# Patient Record
Sex: MALE | Race: WHITE | NOT HISPANIC OR LATINO | URBAN - METROPOLITAN AREA
[De-identification: names, ages, dates, MRNs, and addresses within clinical notes are randomized per-mention and may not be internally consistent; named-entity substitution may affect disease eponyms.]

---

## 2019-06-23 ENCOUNTER — HOSPITAL ENCOUNTER (EMERGENCY)
Facility: HOSPITAL | Age: 59
Discharge: HOME | End: 2019-06-23
Attending: EMERGENCY MEDICINE
Payer: MEDICAID

## 2019-06-23 VITALS
DIASTOLIC BLOOD PRESSURE: 63 MMHG | HEIGHT: 70 IN | WEIGHT: 212 LBS | RESPIRATION RATE: 22 BRPM | SYSTOLIC BLOOD PRESSURE: 101 MMHG | TEMPERATURE: 98 F | OXYGEN SATURATION: 95 % | HEART RATE: 80 BPM | BODY MASS INDEX: 30.35 KG/M2

## 2019-06-23 DIAGNOSIS — R93.89 NODULAR RADIOLOGIC DENSITY: ICD-10-CM

## 2019-06-23 DIAGNOSIS — R09.82 POST-NASAL DRIP: ICD-10-CM

## 2019-06-23 DIAGNOSIS — F17.200 CURRENT EVERY DAY SMOKER: ICD-10-CM

## 2019-06-23 DIAGNOSIS — R05.9 COUGH: Primary | ICD-10-CM

## 2019-06-23 PROCEDURE — 94761 N-INVAS EAR/PLS OXIMETRY MLT: CPT

## 2019-06-23 PROCEDURE — 25000003 PHARM REV CODE 250: Performed by: PHYSICIAN ASSISTANT

## 2019-06-23 PROCEDURE — 99284 EMERGENCY DEPT VISIT MOD MDM: CPT | Mod: 25

## 2019-06-23 PROCEDURE — 25000242 PHARM REV CODE 250 ALT 637 W/ HCPCS: Performed by: PHYSICIAN ASSISTANT

## 2019-06-23 PROCEDURE — 94640 AIRWAY INHALATION TREATMENT: CPT

## 2019-06-23 RX ORDER — BENZONATATE 100 MG/1
100 CAPSULE ORAL 3 TIMES DAILY PRN
Qty: 20 CAPSULE | Refills: 0 | Status: SHIPPED | OUTPATIENT
Start: 2019-06-23 | End: 2019-07-03

## 2019-06-23 RX ORDER — ACETAMINOPHEN 500 MG
1000 TABLET ORAL
Status: COMPLETED | OUTPATIENT
Start: 2019-06-23 | End: 2019-06-23

## 2019-06-23 RX ORDER — IPRATROPIUM BROMIDE AND ALBUTEROL SULFATE 2.5; .5 MG/3ML; MG/3ML
3 SOLUTION RESPIRATORY (INHALATION)
Status: COMPLETED | OUTPATIENT
Start: 2019-06-23 | End: 2019-06-23

## 2019-06-23 RX ORDER — ALBUTEROL SULFATE 90 UG/1
1-2 AEROSOL, METERED RESPIRATORY (INHALATION) EVERY 6 HOURS PRN
Qty: 1 INHALER | Refills: 0 | Status: SHIPPED | OUTPATIENT
Start: 2019-06-23

## 2019-06-23 RX ADMIN — ACETAMINOPHEN 1000 MG: 500 TABLET, FILM COATED ORAL at 11:06

## 2019-06-23 RX ADMIN — IPRATROPIUM BROMIDE AND ALBUTEROL SULFATE 3 ML: .5; 3 SOLUTION RESPIRATORY (INHALATION) at 11:06

## 2019-06-23 NOTE — ED PROVIDER NOTES
"Encounter Date: 6/23/2019    SCRIBE #1 NOTE: I, Yaima Foster, am scribing for, and in the presence of,  JANNET Salinas. I have scribed the following portions of the note - Other sections scribed: HPI,ROS,PE.       History     Chief Complaint   Patient presents with    Cough     c/o productive cough x2 weeks, sweats, was seen at  and given "antibiotics and cough pill" states med are not working. "I know my body. Something is wrong"      CC: Cough    HPI:  This is a 58 y.o. male with a PMHx of emphysema who presents to the ED with a cc of cough x 2 weeks. He reports being seen at [facility] for same symptoms 1 week ago and rx unknown antibiotics with no relief. Patient had follow up the following Thursday for blood work, however did not bring results. Pt reports associated diaphoresis at night. Pt denies vomiting, sore throat, headache, CP, and abdominal pain.Symptoms are worsened at night lying down. Patient reports not using an inhaler or breathing tx.               The history is provided by the patient. No  was used.     Review of patient's allergies indicates:  No Known Allergies  History reviewed. No pertinent past medical history.  Past Surgical History:   Procedure Laterality Date    SPINE SURGERY       History reviewed. No pertinent family history.  Social History     Tobacco Use    Smoking status: Current Every Day Smoker     Packs/day: 0.50     Types: Cigarettes   Substance Use Topics    Alcohol use: Not Currently    Drug use: Not Currently     Review of Systems   Constitutional: Positive for diaphoresis. Negative for chills and fever.   HENT: Negative for congestion, rhinorrhea and sore throat.    Eyes: Negative for visual disturbance.   Respiratory: Positive for cough. Negative for shortness of breath.    Cardiovascular: Negative for chest pain.   Gastrointestinal: Negative for abdominal pain, diarrhea, nausea and vomiting.   Genitourinary: Negative for dysuria, " frequency and hematuria.   Musculoskeletal: Negative for back pain.   Skin: Negative for rash.   Neurological: Negative for dizziness, weakness and headaches.       Physical Exam     Initial Vitals [06/23/19 1040]   BP Pulse Resp Temp SpO2   116/79 86 20 97.7 °F (36.5 °C) 96 %      MAP       --         Physical Exam    Nursing note and vitals reviewed.  Constitutional: He appears well-developed and well-nourished. No distress.   HENT:   Head: Normocephalic and atraumatic.   Nose: Nose normal.   Mouth/Throat: Mucous membranes are dry.   Patient has cobblestoning to posterior pharynx with nasal congestion.   Eyes: EOM are normal. Pupils are equal, round, and reactive to light.   Neck: Normal range of motion. Neck supple.   Cardiovascular: Normal rate, regular rhythm and normal heart sounds. Exam reveals no gallop and no friction rub.    No murmur heard.  Pulmonary/Chest: Breath sounds normal. No respiratory distress. He has no wheezes. He has no rhonchi. He has no rales.   Transmission of upper airway sounds.   Abdominal: Soft. Bowel sounds are normal. He exhibits no mass. There is no tenderness. There is no rebound and no guarding.   Musculoskeletal: Normal range of motion.   Neurological: He is alert and oriented to person, place, and time. He has normal strength. No cranial nerve deficit or sensory deficit.   Skin: Skin is warm and dry. Capillary refill takes less than 2 seconds.   Psychiatric: He has a normal mood and affect.         ED Course   Procedures  Labs Reviewed - No data to display       Imaging Results           X-Ray Chest PA And Lateral (Final result)  Result time 06/23/19 11:24:59    Final result by Colt Soriano MD (06/23/19 11:24:59)                 Impression:      As above.  Correlation with chest CT with contrast is recommended.    This report was flagged in Epic as abnormal.      Electronically signed by: Colt Soriano MD  Date:    06/23/2019  Time:    11:24             Narrative:     EXAMINATION:  XR CHEST PA AND LATERAL    CLINICAL HISTORY:  Cough    TECHNIQUE:  PA and lateral views of the chest were performed.    COMPARISON:  None    FINDINGS:  Cardiac silhouette is normal in size.    There is a somewhat nodular density present in the posterior right perihilar region, best visualized on the lateral projection.  Left lung appears clear.  No definite pleural effusion visualized.  No acute osseous findings demonstrated.                                 Medical Decision Making:   History:   Old Medical Records: I decided to obtain old medical records.  Initial Assessment:   58 y.o. male with cough  Independently Interpreted Test(s):   I have ordered and independently interpreted X-rays - see prior notes.  Clinical Tests:   Radiological Study: Ordered and Reviewed  ED Management:  Chest x-ray shows no pneumonia in the ED today.  However, there is an incidental finding of a nodular density that is concerning for possible malignant origin.  I discussed this with patient advised that he have close outpatient follow-up for further investigation of possible malignant source.  DuoNeb in the ED significantly improved his symptom of cough.  Patient appears to have a COPD that she also undergo further management as an outpatient with a PCP.  Symptoms most consistent with viral URI in the setting of patient who has COPD.  Not septic.  Sent home with supportive care.  Advising follow-up with PCP.  Strict return precautions discussed with patient.  Patient agreeable to plan.            Scribe Attestation:   Scribe #1: I performed the above scribed service and the documentation accurately describes the services I performed. I attest to the accuracy of the note.               Clinical Impression:     1. Cough    2. Post-nasal drip    3. Nodular radiologic density    4. Current every day smoker                                 Lalit Castillo, YOJANA  06/23/19 4885

## 2019-06-23 NOTE — ED TRIAGE NOTES
Patient reports a cold x 2 weeks with constant productive cough, fatigue, SOB. Denies fever, n/v/d, chest pain. Seen by MD on Monday, given antibiotic and cough pills. States he started taking the abx but it made him feel bad so he stopped taking them. Also states cough pills don't work, last taken on last night.

## 2019-10-29 ENCOUNTER — OFFICE VISIT (OUTPATIENT)
Dept: CARDIOLOGY | Facility: CLINIC | Age: 59
End: 2019-10-29
Attending: INTERNAL MEDICINE
Payer: MEDICAID

## 2019-10-29 VITALS
DIASTOLIC BLOOD PRESSURE: 76 MMHG | SYSTOLIC BLOOD PRESSURE: 99 MMHG | WEIGHT: 202 LBS | HEART RATE: 83 BPM | BODY MASS INDEX: 28.92 KG/M2 | HEIGHT: 70 IN

## 2019-10-29 DIAGNOSIS — Z87.01 HISTORY OF PNEUMONIA: ICD-10-CM

## 2019-10-29 DIAGNOSIS — C34.91 NON-SMALL CELL CANCER OF RIGHT LUNG: ICD-10-CM

## 2019-10-29 DIAGNOSIS — Z79.01 CHRONIC ANTICOAGULATION: ICD-10-CM

## 2019-10-29 DIAGNOSIS — I47.10 SUPRAVENTRICULAR TACHYCARDIA: ICD-10-CM

## 2019-10-29 DIAGNOSIS — Z87.891 FORMER SMOKER: ICD-10-CM

## 2019-10-29 DIAGNOSIS — Z86.711 HISTORY OF PULMONARY EMBOLISM: ICD-10-CM

## 2019-10-29 DIAGNOSIS — I48.0 PAROXYSMAL ATRIAL FIBRILLATION: ICD-10-CM

## 2019-10-29 DIAGNOSIS — Z79.899 HIGH RISK MEDICATION USE: ICD-10-CM

## 2019-10-29 DIAGNOSIS — I48.3 TYPICAL ATRIAL FLUTTER: ICD-10-CM

## 2019-10-29 PROBLEM — I48.92 ATRIAL FLUTTER: Status: ACTIVE | Noted: 2019-10-29

## 2019-10-29 PROBLEM — I48.91 ATRIAL FIBRILLATION: Status: ACTIVE | Noted: 2019-10-29

## 2019-10-29 PROCEDURE — 99215 OFFICE O/P EST HI 40 MIN: CPT | Mod: 25,S$GLB,, | Performed by: INTERNAL MEDICINE

## 2019-10-29 PROCEDURE — 99215 PR OFFICE/OUTPT VISIT, EST, LEVL V, 40-54 MIN: ICD-10-PCS | Mod: 25,S$GLB,, | Performed by: INTERNAL MEDICINE

## 2019-10-29 PROCEDURE — 93000 ELECTROCARDIOGRAM COMPLETE: CPT | Mod: S$GLB,,, | Performed by: INTERNAL MEDICINE

## 2019-10-29 PROCEDURE — 93000 PR ELECTROCARDIOGRAM, COMPLETE: ICD-10-PCS | Mod: S$GLB,,, | Performed by: INTERNAL MEDICINE

## 2019-10-29 RX ORDER — ONDANSETRON HYDROCHLORIDE 8 MG/1
8 TABLET, FILM COATED ORAL EVERY 8 HOURS PRN
Refills: 3 | COMMUNITY
Start: 2019-09-17

## 2019-10-29 RX ORDER — AMIODARONE HYDROCHLORIDE 200 MG/1
200 TABLET ORAL DAILY
Qty: 90 TABLET | Refills: 3 | Status: SHIPPED | OUTPATIENT
Start: 2019-10-29

## 2019-10-29 RX ORDER — APIXABAN 5 MG/1
TABLET, FILM COATED ORAL
Refills: 0 | COMMUNITY
Start: 2019-09-23 | End: 2019-10-29

## 2019-10-29 RX ORDER — METOPROLOL TARTRATE 25 MG/1
TABLET, FILM COATED ORAL
Refills: 11 | COMMUNITY
Start: 2019-08-15 | End: 2019-10-29 | Stop reason: SDUPTHER

## 2019-10-29 RX ORDER — METOPROLOL TARTRATE 50 MG/1
50 TABLET ORAL 2 TIMES DAILY
Qty: 180 TABLET | Refills: 3 | Status: SHIPPED | OUTPATIENT
Start: 2019-10-29

## 2019-10-29 RX ORDER — AMIODARONE HYDROCHLORIDE 200 MG/1
TABLET ORAL
COMMUNITY
Start: 2019-10-10 | End: 2019-10-29 | Stop reason: SDUPTHER

## 2019-10-29 RX ORDER — ALPRAZOLAM 0.5 MG/1
TABLET ORAL
Refills: 3 | COMMUNITY
Start: 2019-10-20

## 2019-10-29 RX ORDER — VARENICLINE TARTRATE 0.5 (11)-1
KIT ORAL
COMMUNITY
Start: 2018-09-11

## 2019-10-29 NOTE — PROGRESS NOTES
"Subjective:     Sameer Baptiste is a 58 y.o. male with non-small cell lung cancer. He is former smoker. He began having a chronic cough in the beginning of 2019. Evaluation revealed a left lung nodule for which he underwent a biopsy on 8/14/2019. Pathology revealed non-small cell lung cancer. Due to poor pulmonary status he was not felt to be a surgical candidate. It was decided on chemotherapy and radiation therapy. On 9/18/2019 he became acutely short of breath. A CT angiogram showed subsegmental right lower lobe pulmonary embolism. He began anticoagulation. When admitted he went into atrial fibrillation with fast VRR. This was reasonably well controlled with metoprolol. On 9/20/2019 he had an Echo that revealed normal left ventricular size and systolic function. The RV was moderately dilated with moderate systolic dysfunction. The SPAP could not be assessed. During the stay he had some coughing with blood tinged sputum. He presented with shortness of breath and was having atrial flutter with 2:1 AV conduction and a VRR of 160-170 bpm. He received diltiazem iv on presentation to the ER and converted to sinus. He had another spell after he had been coughing. A "rapid" was called and he was again given diltiazem with conversion to sinus rhythm some time later. He had a CTA suggesting pneumonia. He was begun on amiodarone and had no further arrhythmias. He has continued radiation and chemotherapy. He has had no clinical recurrence of any arrhythmias. No chest pain but very easy becomes short of breath. Feeling fair overall.      Palpitations    This is a recurrent problem. The problem has been resolved. Associated symptoms include malaise/fatigue, shortness of breath and weakness. Pertinent negatives include no anxiety, chest fullness, chest pain, coughing, diaphoresis, dizziness, fever, irregular heartbeat, nausea, near-syncope, numbness, syncope or vomiting.   Atrial Fibrillation   Presents for follow-up visit. " Symptoms include palpitations, shortness of breath and weakness. Symptoms are negative for an AICD problem, bradycardia, chest pain, dizziness, hemodynamic instability, hypertension, hypotension, pacemaker problem, syncope and tachycardia. Past medical history includes atrial fibrillation.   Shortness of Breath   This is a chronic problem. The current episode started more than 1 year ago. The problem occurs constantly. The problem has been unchanged. Pertinent negatives include no abdominal pain, chest pain, claudication, coryza, ear pain, fever, headaches, hemoptysis, leg pain, leg swelling, neck pain, orthopnea, PND, rash, rhinorrhea, sore throat, sputum production, swollen glands, syncope, vomiting or wheezing.       Review of Systems   Constitution: Positive for malaise/fatigue. Negative for chills, diaphoresis and fever.   HENT: Negative for ear pain, nosebleeds, rhinorrhea and sore throat.    Eyes: Negative for double vision, vision loss in left eye and vision loss in right eye.   Cardiovascular: Positive for palpitations. Negative for chest pain, claudication, dyspnea on exertion, irregular heartbeat, leg swelling, near-syncope, orthopnea, paroxysmal nocturnal dyspnea and syncope.   Respiratory: Positive for shortness of breath. Negative for cough, hemoptysis, sputum production and wheezing.    Endocrine: Negative for cold intolerance and heat intolerance.   Hematologic/Lymphatic: Negative for bleeding problem. Does not bruise/bleed easily.   Skin: Negative for color change and rash.   Musculoskeletal: Negative for back pain, falls, muscle weakness, myalgias and neck pain.   Gastrointestinal: Negative for abdominal pain, heartburn, hematemesis, hematochezia, hemorrhoids, jaundice, melena, nausea and vomiting.   Genitourinary: Negative for dysuria and hematuria.   Neurological: Positive for weakness. Negative for dizziness, focal weakness, headaches, light-headedness, loss of balance, numbness and vertigo.  "  Psychiatric/Behavioral: Negative for altered mental status, depression and memory loss. The patient is not nervous/anxious.    Allergic/Immunologic: Negative for hives and persistent infections.       Current Outpatient Medications on File Prior to Visit   Medication Sig Dispense Refill    albuterol (PROVENTIL/VENTOLIN HFA) 90 mcg/actuation inhaler Inhale 1-2 puffs into the lungs every 6 (six) hours as needed for Wheezing. Rescue 1 Inhaler 0    ALPRAZolam (XANAX) 0.5 MG tablet TK 1 T PO TID PRN SLEEP OR ANXIETY  3    amiodarone (PACERONE) 200 MG Tab Take by mouth.      ELIQUIS 5 mg Tab TK 1 T PO BID  0    metoprolol tartrate (LOPRESSOR) 25 MG tablet TK 1 T PO Q 6 H  11    ondansetron (ZOFRAN) 8 MG tablet Take 8 mg by mouth every 8 (eight) hours as needed.  3    varenicline (CHANTIX JENNIFER) 0.5 mg (11)- 1 mg (42) tablet Take as directed on package       No current facility-administered medications on file prior to visit.        BP 99/76   Pulse 83   Ht 5' 10" (1.778 m)   Wt 91.6 kg (202 lb)   BMI 28.98 kg/m²       Objective:     Physical Exam   Constitutional: He is oriented to person, place, and time. He appears well-developed and well-nourished. He appears ill. No distress.   HENT:   Head: Normocephalic and atraumatic.   Nose: Nose normal.   Eyes: Right eye exhibits no discharge. Left eye exhibits no discharge. Right conjunctiva is not injected. Left conjunctiva is not injected. Right pupil is round. Left pupil is round. Pupils are equal.   Neck: Neck supple. No JVD present. Carotid bruit is not present. No thyromegaly present.   Cardiovascular: Normal rate, regular rhythm, S1 normal and S2 normal.  No extrasystoles are present. PMI is not displaced. Exam reveals no gallop.   Pulses:       Radial pulses are 2+ on the right side, and 2+ on the left side.        Femoral pulses are 2+ on the right side, and 2+ on the left side.       Dorsalis pedis pulses are 2+ on the right side, and 2+ on the left side.    "     Posterior tibial pulses are 2+ on the right side, and 2+ on the left side.   Pulmonary/Chest: Effort normal and breath sounds normal.   Abdominal: Soft. Normal appearance. There is no hepatosplenomegaly. There is no tenderness.   Musculoskeletal:        Right ankle: He exhibits no swelling, no ecchymosis and no deformity.        Left ankle: He exhibits no swelling, no ecchymosis and no deformity.   Lymphadenopathy:        Head (right side): No submandibular adenopathy present.        Head (left side): No submandibular adenopathy present.     He has no cervical adenopathy.   Neurological: He is alert and oriented to person, place, and time. He is not disoriented. No cranial nerve deficit.   Skin: Skin is warm, dry and intact. No rash noted. He is not diaphoretic.   Psychiatric: He has a normal mood and affect. His speech is normal and behavior is normal. Judgment and thought content normal. Cognition and memory are normal.       Assessment:     1. Typical atrial flutter    2. Supraventricular tachycardia    3. Paroxysmal atrial fibrillation    4. High risk medication use    5. Chronic anticoagulation    6. History of pulmonary embolism    7. Former smoker    8. Non-small cell cancer of right lung    9. History of pneumonia        Plan:     1. Atrial Flutter   10/6/2019: Diagnosed with 2:1 atrial flutter with VRR of 170 bpm.   Related to lung cancer/radiation and pneumonia.   On metoprolol 50 mg Q12 and amiodarone 200 mg Q24.    2. Supraventricular Tachycardia / Multifocal Atrial Tachycardia    8/14/2019: Noted to have recurrent runs of narrow complex tachycardia with sinus beats in between runs.   On metoprolol 50 mg Q12 and amiodarone 200 mg Q24.    3. Atrial Fibrillation   9/20/2019: Paroxysmal atrial fibrillation.   On metoprolol 50 mg Q12 and amiodarone 200 mg Q24.    4. High Risk Medication   10/7/2019: Began amiodarone.   On amiodarone 200 mg Q8.   10/14/2019: Reduced to amiodarone 200 mg Q12.   10/21/2019:  Reduced to amiodarone 200 mg Q24.   On amiodarone 200 mg Q24.   12/2019: Plan next TSH. Then every 3 months.    5. Chronic Anticoagulation   9/18/2019: Began anticoagulation due to pulmonary embolism.   On apixiban 5 mg Q12.   No aspirin or NSAID.   No bleeding.    6. History of Pulmonary Embolism   9/18/2019: Acutely short of breath.   9/18/2019: CTA: There is occlusion of the subsegmental arteries of the basal segments of the right lower lobe. These are suspicious for pulmonary thromboemboli, less likely vascular invasion by the large right lower lobe mass.   On apixiban.   In setting of malignancy would favor lifelong anticoagulation.     7. Former Smoker   1973: Began smoking. 1.5 ppd.   2019: Quit.    8. Non Small Cell Lung cancer   8/14/2019: Underwent biopsy.   Receiving radiation and chemotherapy.   Dr. Geoff Dennis.    9. Pneumonia   10/7/2019: CTA: Suggesting pneumonia. Received antibiotics iv.    10. Primary Care   Dr. Tellez. [facility].    F/u 3 months.    Lidya Muhammad M.D.